# Patient Record
Sex: FEMALE | Race: ASIAN | NOT HISPANIC OR LATINO | URBAN - METROPOLITAN AREA
[De-identification: names, ages, dates, MRNs, and addresses within clinical notes are randomized per-mention and may not be internally consistent; named-entity substitution may affect disease eponyms.]

---

## 2017-12-06 ENCOUNTER — OFFICE VISIT - HEALTHEAST (OUTPATIENT)
Dept: FAMILY MEDICINE | Facility: CLINIC | Age: 47
End: 2017-12-06

## 2017-12-06 DIAGNOSIS — L30.9 ECZEMA: ICD-10-CM

## 2017-12-06 DIAGNOSIS — R21 SKIN RASH: ICD-10-CM

## 2017-12-06 DIAGNOSIS — B35.4 TINEA CORPORIS: ICD-10-CM

## 2017-12-14 ENCOUNTER — COMMUNICATION - HEALTHEAST (OUTPATIENT)
Dept: FAMILY MEDICINE | Facility: CLINIC | Age: 47
End: 2017-12-14

## 2017-12-20 ENCOUNTER — COMMUNICATION - HEALTHEAST (OUTPATIENT)
Dept: FAMILY MEDICINE | Facility: CLINIC | Age: 47
End: 2017-12-20

## 2020-03-19 ENCOUNTER — ANESTHESIA - HEALTHEAST (OUTPATIENT)
Dept: SURGERY | Facility: HOSPITAL | Age: 50
End: 2020-03-19

## 2020-03-19 ENCOUNTER — SURGERY - HEALTHEAST (OUTPATIENT)
Dept: SURGERY | Facility: HOSPITAL | Age: 50
End: 2020-03-19

## 2020-03-19 ASSESSMENT — MIFFLIN-ST. JEOR
SCORE: 1000.99
SCORE: 992.37

## 2021-02-02 ENCOUNTER — COMMUNICATION - HEALTHEAST (OUTPATIENT)
Dept: SCHEDULING | Facility: CLINIC | Age: 51
End: 2021-02-02

## 2021-05-31 VITALS — WEIGHT: 104 LBS

## 2021-06-04 VITALS — BODY MASS INDEX: 27.75 KG/M2 | HEIGHT: 55 IN | WEIGHT: 119.9 LBS

## 2021-06-07 NOTE — ANESTHESIA CARE TRANSFER NOTE
Last vitals:   Vitals:    03/19/20 2207   BP: 158/82   Pulse: 87   Resp: 24   Temp: 37.1  C (98.7  F)   SpO2: 100%     Patient's level of consciousness is drowsy  Spontaneous respirations: yes  Maintains airway independently: yes  Dentition unchanged: yes  Oropharynx: oropharynx clear of all foreign objects    QCDR Measures:  ASA# 20 - Surgical Safety Checklist: WHO surgical safety checklist completed prior to induction    PQRS# 430 - Adult PONV Prevention: 4558F - Pt received => 2 anti-emetic agents (different classes) preop & intraop  ASA# 8 - Peds PONV Prevention: NA - Not pediatric patient, not GA or 2 or more risk factors NOT present  PQRS# 424 - Callie-op Temp Management: 4559F - At least one body temp DOCUMENTED => 35.5C or 95.9F within required timeframe  PQRS# 426 - PACU Transfer Protocol: - Transfer of care checklist used  ASA# 14 - Acute Post-op Pain: ASA14B - Patient did NOT experience pain >= 7 out of 10

## 2021-06-07 NOTE — ANESTHESIA POSTPROCEDURE EVALUATION
Patient: Maxine Travis  Procedure(s):  CHOLECYSTECTOMY, LAPAROSCOPIC  Anesthesia type: general    Patient location: PACU  Last vitals:   Vitals Value Taken Time   /76 3/19/2020 11:00 PM   Temp 37.7  C (99.9  F) 3/19/2020 11:00 PM   Pulse 85 3/19/2020 11:00 PM   Resp 20 3/19/2020 11:00 PM   SpO2 97 % 3/19/2020 11:00 PM     Post vital signs: stable  Level of consciousness: awake and responds to simple questions  Post-anesthesia pain: pain controlled  Post-anesthesia nausea and vomiting: no  Pulmonary: unassisted, return to baseline  Cardiovascular: stable and blood pressure at baseline  Hydration: adequate  Anesthetic events: no    QCDR Measures:  ASA# 11 - Callie-op Cardiac Arrest: ASA11B - Patient did NOT experience unanticipated cardiac arrest  ASA# 12 - Callie-op Mortality Rate: ASA12B - Patient did NOT die  ASA# 13 - PACU Re-Intubation Rate: ASA13B - Patient did NOT require a new airway mgmt  ASA# 10 - Composite Anes Safety: ASA10A - No serious adverse event    Additional Notes:

## 2021-06-16 PROBLEM — K92.1 MELENA: Status: ACTIVE | Noted: 2021-02-01

## 2021-06-16 PROBLEM — R74.01 TRANSAMINITIS: Status: ACTIVE | Noted: 2021-02-02

## 2021-06-16 PROBLEM — N93.9 ABNORMAL UTERINE BLEEDING (AUB): Status: ACTIVE | Noted: 2021-02-02

## 2021-06-16 PROBLEM — K81.0 ACUTE CHOLECYSTITIS: Status: ACTIVE | Noted: 2020-03-19

## 2021-06-25 NOTE — PROGRESS NOTES
Progress Notes by Jaison Mak DO at 12/6/2017  5:20 PM     Author: Jaison Mak DO Service: -- Author Type: Physician    Filed: 12/7/2017 11:59 AM Encounter Date: 12/6/2017 Status: Signed    : Jaison Mak DO (Physician)       Chief Complaint   Patient presents with   ? Rash     itchy body all over, started in neck area x since May 2017 - has gotten worse     History of Present Illness: Nursing notes reviewed. Patient has had a worsening rash over her neck, and trunk over the past 6 months approximately.  Possible related family medical history includes some family members having a ringworm type rash, which has responded to treatment with over-the-counter antifungal cream.  Patient has had dry skin in the past, which a family member has gotten her Aquaphor for treatment.  Her rash areas are very itchy.  She does not currently have a primary care provider, with her prior primary care provider having moved to a different state.  No respiratory complaints.  She was seen with the assistance of her daughter at exam, who helped with interpretation.    Review of systems: See history of present illness, otherwise negative.     Current Outpatient Prescriptions   Medication Sig Dispense Refill   ? clotrimazole-betamethasone (LOTRISONE) cream Apply to raised areas of rash twice daily for 2 weeks. 30 g 1     No current facility-administered medications for this visit.        No past medical history on file.   No past surgical history on file.   Social History     Social History   ? Marital status:      Spouse name: N/A   ? Number of children: N/A   ? Years of education: N/A     Social History Main Topics   ? Smoking status: Never Smoker   ? Smokeless tobacco: Never Used   ? Alcohol use None   ? Drug use: None   ? Sexual activity: Not Asked     Other Topics Concern   ? None     Social History Narrative   ? None       History   Smoking Status   ? Never Smoker   Smokeless Tobacco   ? Never Used      Exam:    Blood pressure 100/70, pulse 92, temperature 98.2  F (36.8  C), temperature source Oral, resp. rate 24, weight 104 lb (47.2 kg), SpO2 99 %, not currently breastfeeding.    EXAM:   General: Vital signs reviewed. Patient is in some distress due to itchy skin. Breathing is non labored appearing. Patient is alert and oriented x 3.   Skin examination is notable for several areas of raised erythema oval in shape.  Her largest area like this is on the left side of her neck base, measuring about 2 x 3 cm.  She has about 5 other areas on her anterior and posterior trunk, measuring from 1-1.5 cm diameter.  The surface of these areas is cobblestone like, with some changes from linchenfication due to scratching.  There seems to be a separate distinct rash throughout her lumbar area, which is like a course erythematous sandpaper like rash similar to eczema.    Recent Results (from the past 24 hour(s))   SANCHEZ Prep   Result Value Ref Range    KOH Prep No Yeast or Fungal Elements Seen No Yeast or Fungal Elements Seen    Results from exam reviewed with patient.    Assessment/Plan   1. Skin rash  KOH Prep    Culture, Yeast   2. Eczema     3. Tinea corporis         Patient Instructions     I think you have a combination of eczema and ringworm, even though the quick test for ringworm was negative today. Try using OTC Aquaphor over the itchy areas not being treated with the Lotrisone. Use the antifungal cream over the raised rash areas. We will notify you of the fungal culture results, which will be helpful if your rash should fail to improve.    What is Atopic Dermatitis?  Atopic dermatitis (also called eczema) causes chronic skin irritation and is frequently found in infants, teens, and adults. This disease is often genetic (runs in families). It is linked with allergies, such as hay fever and sometimes asthma. Patches of skin become dry, red, itchy, and scaly. In older adults, abnormally dry skin is often called xerosis. Sometimes,  eczema is limited to the hands or feet. It often improves when the skin is well hydrated. It gets worse when the skin is dry. You can help control symptoms by practicing good self-care. Avoid anything that causes flare-ups (such as sunburn or vigorous scratching).  Where do you have symptoms?  Atopic dermatitis symptoms can appear anywhere on the body. But, in most cases, they vary based on the patients age. In infants, irritation appears frequently on the cheeks, chin, near the mouth, and under the eyelids. In children ages 2 through 10, skin folds, such as the backs of the knees, or in the arm crease, are most often affected. In children 11 and older and in adults, symptoms can affect multiple areas.  What triggers symptoms?  Atopic dermatitis symptoms flare because of many factors. These include skin dryness, scratching, stress, harsh soaps, and allergens, such as dust or wool. Try to avoid anything that causes flare-ups.  Recognizing what causes flare-ups  To pinpoint what causes atopic dermatitis to flare, keep a list of factors that seem to affect your skin. Start by filling in the spaces below. Then, keep writing them down in a notebook or diary. The factors that affect each person vary. So, keep your own list and try to avoid your triggers. A good starting place for treatment for anyone with dry skin is to use a daily moisturizer.    Date Last Reviewed: 5/7/2015 2000-2016 [employer]. [address] [place] [address], [place] [id number]. All rights reserved. This information is not intended as a substitute for professional medical care. Always follow your healthcare professional's instructions.        Managing Atopic Dermatitis     After bathing, gently pat your skin dry (dont rub). Apply moisturizer while your skin is still damp.   To manage your symptoms and help reduce the severity and frequency, try these self-care tips:  Caring for your skin    Use a gentle, fragrance-free cleanser (or nonsoap  cleanser) for bathing. Rinse well. Pat skin dry.    Take warm, not hot, baths.     Use moisturizer liberally right after you bathe, while your skin is still damp.    Avoid scratching because it will cause more damage to your skin.     Topical, over-the-counter hydrocortisone cream may help control mild symptoms.   Controlling your environment    Avoid extremes of heat or cold.    Avoid very humid or very dry air.    If your home or office air is very dry, use a humidifier.    Avoid allergens, such as dust, that may be present in bedding, carpets, plush toys, or rugs.    Know that pet hair and dander can cause flare-ups.  Seeking medical treatment  Another way to keep symptoms under control is to seek medical treatment. Talk with your health care provider about the type of treatment that may work best for you. A topical treatment may be prescribed to put on the skin daily. Oral medications (taken by mouth) may also be prescribed. Among medications you may be given are antihistamines, antibiotics, or corticosteroids. Sometimes injections may be needed to control the symptoms, and you may even need antibiotics if skin infections occur. Treatments do not work the same way for every patient. So, if symptoms persist or get worse, ask your health care provider about other treatments.  Making lifestyle choices    Manage the stress in your life.    Wear loose-fitting cotton clothing that does not bind or rub the skin.    Avoid contact with wool or other scratchy fabrics.    Use fragrance-free products.  Getting good results  Now that you know more about atopic dermatitis, the next step is up to you. Follow your health care providers treatment plan and your self-care routine. This will help bring atopic dermatitis under control. If your symptoms persist, be sure to let your health care provider know.   Date Last Reviewed: 5/10/2015    5583-4975 [employer]. [address] [place] [address], [place] [address]. All rights  reserved. This information is not intended as a substitute for professional medical care. Always follow your healthcare professional's instructions.           Jaison Mak, DO

## 2021-06-27 ENCOUNTER — HEALTH MAINTENANCE LETTER (OUTPATIENT)
Age: 51
End: 2021-06-27

## 2021-07-03 NOTE — ANESTHESIA PREPROCEDURE EVALUATION
Anesthesia Preprocedure Evaluation by Rosalia Gordon MD at 3/19/2020  8:59 PM     Author: Rosalia Gordon MD Service: -- Author Type: Physician    Filed: 3/19/2020  9:09 PM Date of Service: 3/19/2020  8:59 PM Status: Signed    : Rosalia Gordon MD (Physician)       Anesthesia Evaluation      Patient summary reviewed     Airway   Mallampati: II  Neck ROM: full   Pulmonary                           Cardiovascular   Exercise tolerance: > or = 4 METS  Rhythm: regular  Rate: normal,         Neuro/Psych      Endo/Other - negative ROS      GI/Hepatic/Renal            Dental                             Anesthesia Plan  Planned anesthetic: general endotracheal  Rsi, rocuronium only please.  ASA 2 - emergent   Induction: intravenous   Anesthetic plan and risks discussed with: patient and child/children (child utilized as an  given COVID-19 Crisis and lack of )  Anesthesia plan special considerations: rapid sequence induction,   Post-op plan: routine recovery

## 2021-10-17 ENCOUNTER — HEALTH MAINTENANCE LETTER (OUTPATIENT)
Age: 51
End: 2021-10-17

## 2022-07-24 ENCOUNTER — HEALTH MAINTENANCE LETTER (OUTPATIENT)
Age: 52
End: 2022-07-24

## 2022-10-02 ENCOUNTER — HEALTH MAINTENANCE LETTER (OUTPATIENT)
Age: 52
End: 2022-10-02

## 2023-08-12 ENCOUNTER — HEALTH MAINTENANCE LETTER (OUTPATIENT)
Age: 53
End: 2023-08-12

## 2024-08-07 ENCOUNTER — TELEPHONE (OUTPATIENT)
Dept: FAMILY MEDICINE | Facility: CLINIC | Age: 54
End: 2024-08-07